# Patient Record
Sex: FEMALE
[De-identification: names, ages, dates, MRNs, and addresses within clinical notes are randomized per-mention and may not be internally consistent; named-entity substitution may affect disease eponyms.]

---

## 2017-03-24 ENCOUNTER — HOSPITAL ENCOUNTER (OUTPATIENT)
Dept: HOSPITAL 42 - ED | Age: 42
Setting detail: OBSERVATION
LOS: 1 days | Discharge: HOME | End: 2017-03-25
Attending: INTERNAL MEDICINE | Admitting: INTERNAL MEDICINE
Payer: MEDICARE

## 2017-03-24 VITALS — SYSTOLIC BLOOD PRESSURE: 131 MMHG | TEMPERATURE: 98 F | DIASTOLIC BLOOD PRESSURE: 75 MMHG

## 2017-03-24 VITALS — BODY MASS INDEX: 22.9 KG/M2

## 2017-03-24 DIAGNOSIS — Z87.892: ICD-10-CM

## 2017-03-24 DIAGNOSIS — G89.29: ICD-10-CM

## 2017-03-24 DIAGNOSIS — K85.90: Primary | ICD-10-CM

## 2017-03-24 DIAGNOSIS — F31.9: ICD-10-CM

## 2017-03-24 DIAGNOSIS — J45.909: ICD-10-CM

## 2017-03-24 DIAGNOSIS — K56.41: ICD-10-CM

## 2017-03-24 DIAGNOSIS — Z88.0: ICD-10-CM

## 2017-03-24 DIAGNOSIS — R31.9: ICD-10-CM

## 2017-03-24 DIAGNOSIS — F41.9: ICD-10-CM

## 2017-03-24 DIAGNOSIS — Z90.49: ICD-10-CM

## 2017-03-24 DIAGNOSIS — K86.1: ICD-10-CM

## 2017-03-24 DIAGNOSIS — F17.210: ICD-10-CM

## 2017-03-24 DIAGNOSIS — R10.9: ICD-10-CM

## 2017-03-24 DIAGNOSIS — E86.9: ICD-10-CM

## 2017-03-24 LAB
ADD MANUAL DIFF?: NO [,]
ALBUMIN/GLOB SERPL: 1.2 [,] (ref 1.1–1.8)
ALP SERPL-CCNC: 99 [, U/L] (ref 38–133)
ALT SERPL-CCNC: 26 [, U/L] (ref 7–56)
AMYLASE SERPL-CCNC: 40 [, U/L] (ref 35–125)
APPEARANCE UR: (no result) [,]
AST SERPL-CCNC: 28 [, U/L] (ref 15–39)
BACTERIA #/AREA URNS HPF: (no result) [,]
BASOPHILS # BLD AUTO: 0.03 [, K/MM3] (ref 0–2)
BASOPHILS NFR BLD: 0.3 [, %] (ref 0–3)
BILIRUB SERPL-MCNC: 0.5 [, MG/DL] (ref 0.2–1.3)
BILIRUB UR-MCNC: (no result) [,]
BUN SERPL-MCNC: 9 [, MG/DL] (ref 7–21)
CALCIUM SERPL-MCNC: 8.8 [, MG/DL] (ref 8.4–10.5)
CHLORIDE SERPL-SCNC: 104 [, MMOL/L] (ref 98–107)
CO2 SERPL-SCNC: 24 [, MMOL/L] (ref 21–33)
COLOR UR: YELLOW [,]
EOSINOPHIL # BLD: 0.3 [,] (ref 0–0.7)
EOSINOPHIL NFR BLD: 3 [, %] (ref 1.5–5)
EPI CELLS #/AREA URNS HPF: (no result) [, /HPF] (ref 0–5)
ERYTHROCYTE [DISTWIDTH] IN BLOOD BY AUTOMATED COUNT: 14.2 [, %] (ref 11.5–14.5)
GLOBULIN SER-MCNC: 3.3 [, GM/DL]
GLUCOSE SERPL-MCNC: 105 [, MG/DL] (ref 70–110)
GLUCOSE UR STRIP-MCNC: NEGATIVE [, MG/DL]
GRANULOCYTES # BLD: 5.83 [,] (ref 1.4–6.5)
GRANULOCYTES NFR BLD: 63.4 [, %] (ref 50–68)
HCT VFR BLD CALC: 37.2 [, %] (ref 36–48)
KETONES UR STRIP-MCNC: (no result) [, MG/DL]
LEUKOCYTE ESTERASE UR-ACNC: (no result) [, LEU/UL]
LIPASE SERPL-CCNC: 29 [, U/L] (ref 23–300)
LYMPHOCYTES # BLD: 2.7 [,] (ref 1.2–3.4)
LYMPHOCYTES NFR BLD AUTO: 29.6 [, %] (ref 22–35)
MCH RBC QN AUTO: 30.9 [, PG] (ref 25–35)
MCHC RBC AUTO-ENTMCNC: 34.4 [, G/DL] (ref 31–37)
MCV RBC AUTO: 89.9 [, FL] (ref 80–105)
MONOCYTES # BLD AUTO: 0.3 [,] (ref 0.1–0.6)
MONOCYTES NFR BLD: 3.7 [, %] (ref 1–6)
PH UR STRIP: 6 [,] (ref 4.7–8)
PLATELET # BLD: 261 [, 10^3/UL] (ref 120–450)
PMV BLD AUTO: 10.6 [, FL] (ref 7–11)
POTASSIUM SERPL-SCNC: 4 [, MMOL/L] (ref 3.6–5)
PROT SERPL-MCNC: 7.1 [, G/DL] (ref 5.8–8.3)
PROT UR STRIP-MCNC: 30 [, MG/DL]
RBC # UR STRIP: (no result) [,]
SODIUM SERPL-SCNC: 137 [, MMOL/L] (ref 132–148)
SP GR UR STRIP: 1.02 [,] (ref 1–1.03)
UROBILINOGEN UR STRIP-ACNC: 0.2 [, E.U./DL]
WBC # BLD AUTO: 9.2 [, 10^3/UL] (ref 4.5–11)

## 2017-03-24 PROCEDURE — 96361 HYDRATE IV INFUSION ADD-ON: CPT

## 2017-03-24 PROCEDURE — 83690 ASSAY OF LIPASE: CPT

## 2017-03-24 PROCEDURE — 74176 CT ABD & PELVIS W/O CONTRAST: CPT

## 2017-03-24 PROCEDURE — 82150 ASSAY OF AMYLASE: CPT

## 2017-03-24 PROCEDURE — 84703 CHORIONIC GONADOTROPIN ASSAY: CPT

## 2017-03-24 PROCEDURE — 85025 COMPLETE CBC W/AUTO DIFF WBC: CPT

## 2017-03-24 PROCEDURE — 81001 URINALYSIS AUTO W/SCOPE: CPT

## 2017-03-24 PROCEDURE — 80053 COMPREHEN METABOLIC PANEL: CPT

## 2017-03-24 PROCEDURE — 96376 TX/PRO/DX INJ SAME DRUG ADON: CPT

## 2017-03-24 PROCEDURE — 99285 EMERGENCY DEPT VISIT HI MDM: CPT

## 2017-03-24 PROCEDURE — 96374 THER/PROPH/DIAG INJ IV PUSH: CPT

## 2017-03-24 PROCEDURE — 96375 TX/PRO/DX INJ NEW DRUG ADDON: CPT

## 2017-03-24 RX ADMIN — HYDROMORPHONE HYDROCHLORIDE PRN MG: 2 INJECTION, SOLUTION INTRAMUSCULAR; INTRAVENOUS; SUBCUTANEOUS at 21:27

## 2017-03-24 NOTE — CP.PCM.HP
<Karina Vázquez - Last Filed: 17 01:52>





History of Present Illness





- History of Present Illness


History of Present Illness: 


PGY-1 for Dr. Montaño





Admission: Mild Acute on chronic pancreatis





Patient is a 42 yo female with past medical history of pancreatic divisum at 

birth, has pancreatic stent with recent replacement 3 days ago and PICC 4 days 

ago, presents with worsening of chronic upper abdominal pain, (+) nausea, no 

vomiting, and decreased appetite over the past three days.  Pt States that at 

baseline, abdominal pain localizes at LUQ, sharp, constant, 4-6/10 in pain 

scale. This am the pain suddenly worsened to 8-10/10, not changed in character 

or location. Pt called her outpt GI doc Dr. Agustin who adviced pt to come to 

ED. (+) sick contact, daughter strep throat. Denies recent change in medications

, recent travel, change in food.





In the ED, VSS. CBC, CMP all wnl. Amylase, lipase normal. Proteinura, blood in 

urine, many epithelial cell


Pain controlled by dilaudid 3mg.





CT Abdomen - 





ROS = Denies Fever. Denies chest pain or shortness of breath. Denies 

hematemesis or dark or bloody stools.





PMH    pancreatic divisum at birth


   Chronic pancreatitis


   Asthma


   Bipolar, anxiety


   contact lens





PSH   Pancreas stent replacment @ month, PICC





OBGYN         1 elective , 1 live birth via vaginal delivery


          No period, on Oral contraceptive





SH   Active smoker 1 pack per week x recent years + electronic cigareete 1 once 

daily


      Past 10 years 1-2ppd


      Denies alcohol/drug





All   Penicillin





Med   Microgestin 1 tab PO DAILY


      Methadone [Methadone]   5 mg PO BID PRN


      Oxydodone 20 q6 PRN


      Duoneb 3q4 PRN


      Klonopin 0.5  TID PRN


      Simethicone 40 mg QID PRN


      Seroquel 200 daily


      Zofran 4q4 PRN





PMD/GI   Dr Agustin





Present on Admission





- Present on Admission


Any Indicators Present on Admission: No





Past Patient History





- Infectious Disease


Hx of Infectious Diseases: None





- Tetanus Immunizations


Tetanus Immunization: Unknown





- Past Medical History & Family History


Past Medical History?: Yes





- Past Social History


Smoking Status: Light Smoker < 10 Cigarettes Daily





- CARDIAC


Hx Cardiac Disorders: No





- PULMONARY


Hx Asthma: Yes





- NEUROLOGICAL


Hx Neurological Disorder: No





- HEENT


Hx HEENT Problems: Yes (wears contact lense)





- RENAL


Hx Chronic Kidney Disease: No





- ENDOCRINE/METABOLIC


Hx Endocrine Disorders: No





- HEMATOLOGICAL/ONCOLOGICAL


Hx Blood Disorders: No


Hx Blood Transfusions: No


Hx Blood Transfusion Reaction: No





- INTEGUMENTARY


Hx Dermatological Problems: No





- MUSCULOSKELETAL/RHEUMATOLOGICAL


Hx Musculoskeletal Disorders: No


Hx Falls: No





- GASTROINTESTINAL


Hx Pancreatitis: Yes (pancreatic divisum)





- GENITOURINARY/GYNECOLOGICAL


Hx Genitourinary Disorders: No





- PSYCHIATRIC


Hx Bipolar Disorder: Yes


Hx Emotional Abuse: No


Hx Physical Abuse: No


Hx Substance Use: No





- SURGICAL HISTORY


Other/Comment: Pancreas stent , PICC





- ANESTHESIA


Hx Anesthesia: Yes





Meds


Allergies/Adverse Reactions: 


 Allergies











Allergy/AdvReac Type Severity Reaction Status Date / Time


 


Penicillins Allergy  ANAPHYLAXIS Verified 17 13:13














Physical Exam





- Constitutional


Appears: No Acute Distress





- Head Exam


Head Exam: ATRAUMATIC, NORMOCEPHALIC





- Eye Exam


Eye Exam: EOMI, Normal appearance


Pupil Exam: NORMAL ACCOMODATION





- ENT Exam


ENT Exam: Mucous Membranes Moist





- Neck Exam


Neck exam: Positive for: Normal Inspection


Additional comments: 


supple





- Respiratory Exam


Respiratory Exam: Clear to Auscultation Bilateral, NORMAL BREATHING PATTERN.  

absent: Rales, Rhonchi, Wheezes





- Cardiovascular Exam


Cardiovascular Exam: REGULAR RHYTHM, +S1, +S2.  absent: Systolic Murmur





- GI/Abdominal Exam


GI & Abdominal Exam: Normal Bowel Sounds, Soft, Tenderness (LUQ).  absent: 

Guarding, Hernia, Rigid


Additional comments: 


negative rovsing, mcburney, moss





- Extremities Exam


Extremities exam: Positive for: normal capillary refill, normal inspection, 

pedal pulses present.  Negative for: pedal edema





- Back Exam


Back exam: absent: CVA tenderness (L), CVA tenderness (R)





- Neurological Exam


Neurological exam: Alert, Oriented x3





- Psychiatric Exam


Psychiatric exam: Normal Affect, Normal Mood





- Skin


Skin Exam: Dry, Warm





Results





- Vital Signs


Recent Vital Signs: 





 Last Vital Signs











Temp  97.5 F L  17 18:01


 


Pulse  68   17 18:01


 


Resp  20   17 18:01


 


BP  125/80   17 18:01


 


Pulse Ox  100   17 18:01














- Labs


Result Diagrams: 


 17 14:05





 03/24/17 14:05





Assessment & Plan





- Assessment and Plan (Free Text)


Plan: 


Patient is a 42 yo female with past medical history of asthma, pancreatic 

divisum at birth, has pancreatic stent with recent replacement 3 days ago and 

PICC 4 days ago, presents with worsening of chronic upper abdominal pain, (+) 

nausea, no vomiting, and decreased appetite over the past three days.  





Acute on chronic Pancreatitis


- Observe off antibiotcs


- IVF





Abdominal Pain


- NPO today


- Clear liquid diet AM


-  cc


- zofran PRN


- dialud 1 for moderate, 2 for severe pain, q 3 prn


- GI consult - Dr. Singh


- Hold home pain mds





Anxiety


- klonopin 0.25 q8h PRN





Hx asthma - duoneb PRN 





Prophylaxis


- IV protonix


- SCD





S/R/D/w Dr. Montaño





- Date & Time


Date: 17


Time: 20:40





<Wilmer Montaño Q - Last Filed: 17 02:24>





Results





- Vital Signs


Recent Vital Signs: 





 Last Vital Signs











Temp  98 F   17 22:59


 


Pulse  50 L  17 22:59


 


Resp  16   17 22:59


 


BP  131/75   17 22:59


 


Pulse Ox  97   17 22:03














- Labs


Result Diagrams: 


 17 14:05





 17 14:05





Attending/Attestation





- Attestation


I have personally seen and examined this patient.: Yes


I have fully participated in the care of the patient.: Yes


I have reviewed all pertinent clinical information: Yes

## 2017-03-24 NOTE — ED PDOC
Arrival/HPI





- General


Chief Complaint: Abdominal Pain


Time Seen by Provider: 03/24/17 13:25


Historian: Patient





- History of Present Illness


Narrative History of Present Illness (Text): 





03/24/17 14:29


Patient is a 42 yo female with past medical history of pancreatic divisum, has 

pancreatic stent with recent replacement 3 days ago, presents with history of 

upper abdominal pain, nausea/vomiting, and decreased appetite over the past 

three days.  States pain is typical of past episodes, not changed in character 

or location.


Denies fevers.  Denies chest pain or shortness of breath. Denies hematemesis or 

dark or bloody stools.





Past Medical History





- Infectious Disease


Hx of Infectious Diseases: None





- Tetanus Immunization


Tetanus Immunization: Unknown





- Cardiac


Hx Cardiac Disorders: No





- Pulmonary


Hx Asthma: Yes





- Neurological


Hx Neurological Disorder: No





- HEENT


Hx HEENT Disorder: Yes (wears contact lense)





- Renal


Hx Renal Disorder: No





- Endocrine/Metabolic


Hx Endocrine Disorders: No





- Hematological/Oncological


Hx Blood Disorders: No


Hx Blood Transfusions: No


Hx Blood Transfusion Reaction: No





- Integumentary


Hx Dermatological Disorder: No





- Musculoskeletal/Rheumatological


Hx Musculoskeletal Disorders: No


Hx Falls: No





- Gastrointestinal


Hx Pancreatitis: Yes (pancreatic divisum)





- Genitourinary/Gynecological


Hx Genitourinary Disorders: No





- Psychiatric


Hx Bipolar Disorder: Yes


Hx Emotional Abuse: No


Hx Physical Abuse: No


Hx Substance Use: No





- Surgical History


Other/Comment: Pancreas stent , PICC





- Anesthesia


Hx Anesthesia: Yes





- Suicidal Assessment


Feels Threatened In Home Enviroment: No





Family/Social History


Family/Social History: Unknown Family HX


Smoking Status: Light Smoker < 10 Cigarettes Daily


Hx Alcohol Use: No


Hx Substance Use: No





Allergies/Home Meds


Allergies/Adverse Reactions: 


Allergies





Penicillins Allergy (Verified 03/24/17 13:13)


 ANAPHYLAXIS








Home Medications: 


 Home Meds











 Medication  Instructions  Recorded  Confirmed


 


Birth Control 1 tab PO DAILY 08/07/15 03/24/17


 


Methadone 5 mg PO BID PRN 03/24/17 03/24/17














Review of Systems





- Review of Systems


Constitutional: Fatigue.  absent: Fevers


Eyes: absent: Vision Changes


ENT: absent: Hearing Changes


Respiratory: absent: SOB


Cardiovascular: absent: Chest Pain, FITZGERALD


Gastrointestinal: Abdominal Pain, Nausea, Vomiting, Appetite Changes.  absent: 

Constipation, Diarrhea, Hematochezia, Hematemesis


Musculoskeletal: absent: Back Pain


Skin: absent: Rash


Neurological: absent: Headache, Dizziness


Endocrine: absent: Polyuria, Polydipsia


Hemo/Lymphatic: absent: Easy Bleeding





Physical Exam





- Physical Exam


Narrative Physical Exam (Text): 


Head:  Atraumatic.  Normocephalic. 


Eyes:  PERRL.  EOMI.  Conjunctivae are not pale.


ENT:  Mucous membranes are moist and intact.  Oropharynx is clear and 

symmetric. 


Neck:  Supple.  Full ROM.  No JVD.  No lymphadenopathy.


Cardiovascular:  Regular rate.  Regular rhythm.  No murmurs, rubs, or gallops.  

Distal pulses are 2+ and symmetric.


Pulmonary/Chest:  No evidence of respiratory distress.  Clear to auscultation 

bilaterally.  No wheezing, rales or rhonchi.


Abdominal:  Moderate epigastric pain with normoactive bowel sounds, no lower 

abdominal pain, no pulsatile masses.


Back:  No CVA tenderness.


Extremities:  No edema.   No cyanosis.  No clubbing.  Full range of motion in 

all extremities.  No calf tenderness. PICC lined in left upper extremity clean, 

dry and intact.


Skin:  Skin is warm and dry.  No petechiae.  No purpura. 


Neurological:  Alert, awake, and oriented to person, place, time, and 

situation.  Normal speech. Motor and sensory exam intact.


Psychiatric:  Good eye contact.  Normal interaction, affect, and behavior. 











Vital Signs Reviewed: Yes


Vital Signs











  Temp Pulse Resp BP Pulse Ox


 


 03/24/17 22:03   70  16  138/77  97


 


 03/24/17 18:01  97.5 F L  68  20  125/80  100


 


 03/24/17 15:01   69  18  115/71  98


 


 03/24/17 13:22  97.9 F  73  18  117/75  98


 


 03/24/17 13:16  97.9 F  73  16  117/75  96











Temperature: Afebrile


Appearance: Positive for: Uncomfortable


Pain Distress: Moderate


Mental Status: Positive for: Alert and Oriented X 3





Medical Decision Making


ED Course and Treatment: 


Patient's case reviewed with Dr. Agustin, prior to arrival, who is the patient'

s gastroenterologist and has been following most recent symptoms.  Patient's 

history reviewed where she has had history of chronic pancreatitis/divisum, 

recently with stent change.


On exam she is diffusely tender, concentrated to epigastric region.  NO urinary 

symptoms, no chest pain or sob or chills.





As per Dr. Agustin, patient initiated on iv fluids and pain medication, risks/

side effects/addiction potential reviewed with patient prior to administration 

although patient is currently taking oral narcotics and pain has broken through.


With serial exams, no improvement in pain after multiple boluses, thus CT 

ordered as pain still severe after medication.  CT ordered:





CT Abdomen and Pelvis Without Intravenous Contrast.


FINDINGS:


Lower thorax: Heart size is normal. Lung bases are mildly hyperinflated. There 

is patchy airspace disease greatest at the left base. There is minimal scarring 

at the lung bases


ABDOMEN:


Gallbladder and bile ducts: Gallbladder is surgically absent. Common bile duct 

is prominent.


Pancreas: There are coarse calcifications in the pancreas greatest in the body 

and tail. There is a pancreatic stent. There is minimal peripancreatic 

stranding.


Stomach and bowel: Stomach is almost completely empty. Rotation is normal. 

Proximal jejunal loops are mildly distended with air-fluid levels. Distention 

decreases distally. Appendix and terminal ileum are unremarkable. There is a 

moderately large amount of stool throughout the colon.


ABDOMEN and PELVIS:


Intraperitoneal space: There is no significant fluid.There is no free air.


Bones/joints: There are degenerative changes in the osseus structures. Soft 

tissues: There is a fatcontaining umbilical hernia.


Vasculature: There are multiple phleboliths. There are vascular calcifications.


IMPRESSION: Chronic calcific pancreatitis with pancreatic stent, minimal 

peripancreatic stranding inflammation versus scarring; cholecystectomy with 

dilated common duct


Dictated and Authenticated by: Sarah Ansari MD


03/24/2017 8:23 PM Eastern Time ([place] & [place])





As patient with persistent severe pain will be admitted for observation with GI 

consultation, plan reviewed with Dr. Agustin, will admit to hospitalist.








- Lab Interpretations


Lab Results: 








 03/24/17 14:05 





 03/24/17 14:05 





 Lab Results





03/24/17 14:05: WBC 9.2, RBC 4.14, Hgb 12.8, Hct 37.2, MCV 89.9, MCH 30.9, MCHC 

34.4, RDW 14.2, Plt Count 261, MPV 10.6, Gran % 63.4, Lymph % (Auto) 29.6, Mono 

% (Auto) 3.7, Eos % (Auto) 3.0, Baso % (Auto) 0.3, Gran # 5.83, Lymph # 2.7, 

Mono # 0.3, Eos # 0.3, Baso # 0.03, Sodium 137, Potassium 4.0, Chloride 104, 

Carbon Dioxide 24, Anion Gap 13, BUN 9, Creatinine 0.7, Est GFR (African Amer) 

> 60, Est GFR (Non-Af Amer) > 60, Random Glucose 105, Calcium 8.8, Total 

Bilirubin 0.5, AST 28, ALT 26, Alkaline Phosphatase 99, Total Protein 7.1, 

Albumin 3.8, Globulin 3.3, Albumin/Globulin Ratio 1.2, Amylase 40, Lipase 29


03/24/17 13:45: Urine Color Yellow, Urine Appearance Turbid, Urine pH 6.0, Ur 

Specific Gravity 1.025, Urine Protein 30 H, Urine Glucose (UA) Negative, Urine 

Ketones Trace H, Urine Blood Small H, Urine Nitrate Negative, Urine Bilirubin 

Small H, Urine Urobilinogen 0.2, Ur Leukocyte Esterase Trace H, Urine RBC 1 - 3

, Urine WBC 1 - 3, Ur Epithelial Cells Many, Urine Bacteria Many, Urine HCG, 

Qual Negative











- RAD Interpretation


Radiology Orders: 








03/24/17 18:03


ABD & PELVIS W/O PO OR IV CONT [CT] Stat 














- Medication Orders


Current Medication Orders: 











Discontinued Medications





Albuterol/Ipratropium (Duoneb 3 Mg/0.5 Mg (3 Ml) Ud)  3 ml IH [id number] PRN


   PRN Reason: Wheezing


Clonazepam (Klonopin)  0.25 mg PO Q8 PRN; Protocol


   PRN Reason: Anxiety


Famotidine (Pepcid)  20 mg IVP STAT STA


   Stop: 03/24/17 13:30


   Last Admin: 03/24/17 14:07  Dose: 20 MG





IVP Administration


 Document     03/24/17 14:07  HI  (Rec: 03/24/17 14:07  [id number])


     Charges for Administration


      # of IVP Administrations                   1





Hydromorphone HCl (Dilaudid)  3 mg IVP STAT STA


   Stop: 03/24/17 13:33


   Last Admin: 03/24/17 14:08  Dose: 3 MG





IVP Administration


 Document     03/24/17 14:08  HI  (Rec: 03/24/17 14:08  [facility] [id number])


     Charges for Administration


      # of IVP Administrations                   1





Hydromorphone HCl (Dilaudid)  3 mg IVP STAT STA


   Stop: 03/24/17 15:40


   Last Admin: 03/24/17 15:56  Dose: 3 MG





IVP Administration


 Document     03/24/17 15:56  HI  (Rec: 03/24/17 15:56  [facility] [id number])


     Charges for Administration


      # of IVP Administrations                   1





Hydromorphone HCl (Dilaudid)  2 mg IVP STAT STA


   Stop: 03/24/17 17:20


   Last Admin: 03/24/17 17:50  Dose: 2 MG





IVP Administration


 Document     03/24/17 17:50  HI  (Rec: 03/24/17 17:50  [facility] [id number])


     Charges for Administration


      # of IVP Administrations                   1





Hydromorphone HCl (Dilaudid)  2 mg IVP Q3 PRN


   PRN Reason: Pain, severe (8-10)


   Last Admin: 03/25/17 09:42  Dose: 2 MG





MAR Pain Assessment


 Document     03/25/17 09:42  PRISCA  (Rec: 03/25/17 09:44  PRISCA  [id number])


     Pain Reassessment


      Is this a pain reassessment?               No


     Sleep


      Is patient sleeping during reassessment?   No


     Presence of Pain


      Presence of Pain                           Yes


     Pain Scale Used


      Pain Scale Used                            Numeric


     Location


      Left, Right or Bilateral                   Left


      Upper or Lower                             Upper


      Pain Location Body Site                    Abdomen


     Description


      Description                                Constant


      Intensity of Pain at present               8


      Pain Behavior                              Irritability


                                                 Withdrawal from Touch


                                                 Restlessness


                                                 Facial Grimacing


      Alleviating Factors/Management             Medication


       Techniques                                


IVP Administration


 Document     03/25/17 09:42  PRISCA  (Rec: 03/25/17 09:44  PRISCA  [id number])


     Charges for Administration


      # of IVP Administrations                   1





Sodium Chloride (Sodium Chloride 0.9%)  1,000 mls @ 1,000 mls/hr IV .Q1H STA


   Stop: 03/24/17 14:28


   Last Admin: 03/24/17 14:07  Dose: 1,000 MLS/HR





eMAR Start Stop


 Document     03/24/17 14:07  HI  (Rec: 03/24/17 14:07  [facility] [id number])


     Intravenous Solution


      Start Date                                 03/24/17


      Start Time                                 14:07


      End Date                                   03/24/17


      End time                                   15:07


      Total Infusion Time                        60





Sodium Chloride (Sodium Chloride 0.9%)  1,000 mls @ 150 mls/hr IV .Q6H40M [facility]


   Last Admin: 03/24/17 22:48  Dose: 150 MLS/HR





eMAR Start Stop


 Document     03/24/17 22:48  PCO  (Rec: 03/24/17 22:49  PCO  [facility] [id number])


     Intravenous Solution


      Start Date                                 03/24/17


      Start Time                                 22:48


      End Date                                   03/24/17


      End time                                   04:50


      Total Infusion Time                        -1078





Ondansetron HCl (Zofran Inj)  4 mg IVP STAT STA


   Stop: 03/24/17 13:30


   Last Admin: 03/24/17 14:07  Dose: 4 MG





IVP Administration


 Document     03/24/17 14:07  HI  (Rec: 03/24/17 14:07  HI  [id number])


     Charges for Administration


      # of IVP Administrations                   1





Ondansetron HCl (Zofran Inj)  4 mg IVP Q6 PRN


   PRN Reason: Nausea/Vomiting


   Last Admin: 03/25/17 06:32  Dose: 4 MG





IVP Administration


 Document     03/25/17 06:32  PCO  (Rec: 03/25/17 06:32  PCO  [id number])


     Charges for Administration


      # of IVP Administrations                   1





Oxycodone HCl (Oxycodone Immediate Release Tab)  20 mg PO Q6H PRN


   PRN Reason: Pain, severe (8-10)


Pantoprazole Sodium (Protonix Inj)  40 mg IVP DAILY [facility]


   Last Admin: 03/25/17 09:45  Dose: 40 MG





IVP Administration


 Document     03/25/17 09:45  PRISCA  (Rec: 03/25/17 09:45  [id number])


     Charges for Administration


      # of IVP Administrations                   1





Quetiapine Fumarate (Seroquel)  200 mg PO DAILY BRADY


   PRN Reason: Protocol


   Last Admin: 03/25/17 09:44  Dose: 200 MG





Behavioural


 Document     03/25/17 09:44  PRISCA  (Rec: 03/25/17 09:44  [facility]-[id number])


     Maintenance


      Maintenance Dose                           Yes


     Nonmedicinal


      Nonmedicinal Interventions                 Redirect


                                                 Therapeutic Communication


                                                 Give food/fluids


     Behavior


      Behavior for Medication:                   Anxiety





Simethicone (Mylicon Liq)  40 mg PO QID PRN


   PRN Reason: Dyspepsia











- Scribe Statement


The provider has reviewed the documentation as recorded by the Valariee


Kev Zayas





All medical record entries made by the Rayshawn were at my direction and 

personally dictated by me. I have reviewed the chart and agree that the record 

accurately reflects my personal performance of the history, physical exam, 

medical decision making, and the department course for this patient. I have 

also personally directed, reviewed, and agree with the discharge instructions 

and disposition.





Disposition/Present on Arrival





- Present on Arrival


Any Indicators Present on Arrival: No


History of DVT/PE: No


History of Uncontrolled Diabetes: No


Urinary Catheter: No


History of Decub. Ulcer: No


History Surgical Site Infection Following: None





- Disposition


Have Diagnosis and Disposition been Completed?: Yes


Diagnosis: 


 Pancreatitis


Disposition: HOSPITALIZED


Disposition Time: 17:13


Patient Plan: Discharge


Condition: GOOD

## 2017-03-24 NOTE — CT
EXAM:

  CT Abdomen and Pelvis Without Intravenous Contrast.



CLINICAL HISTORY:

  41 years old, female; Condition or disease; Pancreatic condition; 

Pancreatitis; Additional info: Pancreatitis with stent



TECHNIQUE:

  Axial computed tomography images of the abdomen and pelvis without 

intravenous contrast.  This CT exam was performed using one or more of the 

following dose reduction techniques:  automated exposure control, adjustment of 

the mA and/or kV according to patient size, and/or use of iterative 

reconstruction technique.

  Coronal and sagittal reformatted images were created and reviewed.



EXAM DATE/TIME:

  3/24/2017 6:03 PM



COMPARISON:

  There are no prior studies for comparison.



FINDINGS:

  Lower thorax:  Heart size is normal.  Lung bases are mildly hyperinflated.  

There is patchy airspace disease greatest at the left base.  There is minimal 

scarring at the lung bases



 ABDOMEN:

  Liver:  unremarkable

  Gallbladder and bile ducts:  Gallbladder is surgically absent.  Common bile 

duct is prominent.

  Pancreas:  There are coarse calcifications in the pancreas greatest in the 

body and tail.  There is a pancreatic stent.  There is minimal peripancreatic 

stranding.

  Spleen:  unremarkable

  Adrenals:  unremarkable

  Kidneys and ureters:  unremarkable

  Stomach and bowel:  Stomach is almost completely empty.  Rotation is normal.  

Proximal jejunal loops are mildly distended with air-fluid levels.  Distention 

decreases distally. Appendix and terminal ileum are unremarkable.  There is a 

moderately large amount of stool throughout the colon.

  Appendix:  See above.



 PELVIS:

  Bladder:  unremarkable

  Reproductive:  Uterus and adnexal structures are unremarkable.



 ABDOMEN and PELVIS:

  Intraperitoneal space:  There is no significant fluid.There is no free air.

  Bones/joints:  There are degenerative changes in the osseus structures.  Soft 

tissues:  There is a fat-containing umbilical hernia.

  Vasculature:  There are multiple phleboliths.  There are vascular 

calcifications.

  Lymph nodes:  There is no pathologic adenopathy.



IMPRESSION:  Chronic calcific pancreatitis with pancreatic stent, minimal 

peripancreatic stranding inflammation versus scarring; cholecystectomy with 

dilated common duct



Additional findings as described above.

## 2017-03-25 VITALS — HEART RATE: 54 BPM | RESPIRATION RATE: 20 BRPM | OXYGEN SATURATION: 96 %

## 2017-03-25 RX ADMIN — HYDROMORPHONE HYDROCHLORIDE PRN MG: 2 INJECTION, SOLUTION INTRAMUSCULAR; INTRAVENOUS; SUBCUTANEOUS at 00:16

## 2017-03-25 RX ADMIN — HYDROMORPHONE HYDROCHLORIDE PRN MG: 2 INJECTION, SOLUTION INTRAMUSCULAR; INTRAVENOUS; SUBCUTANEOUS at 03:41

## 2017-03-25 RX ADMIN — HYDROMORPHONE HYDROCHLORIDE PRN MG: 2 INJECTION, SOLUTION INTRAMUSCULAR; INTRAVENOUS; SUBCUTANEOUS at 09:42

## 2017-03-25 RX ADMIN — HYDROMORPHONE HYDROCHLORIDE PRN MG: 2 INJECTION, SOLUTION INTRAMUSCULAR; INTRAVENOUS; SUBCUTANEOUS at 06:26

## 2017-03-25 NOTE — CON
DATE: 03/25/2017



I examined the patient this morning.  She is a 41-year-old white female known 
to consultant with past medical history of pancreas divisum.  I reviewed this 
case with Dr. Evangelist Ortez in the Emergency Room yesterday.  The patient recently 
had a pancreatic duct stent replaced by a [place] [facility]; 
subsequently experienced exacerbation of abdominal pain with nausea and 
vomiting.  This stent exchange is done on a periodic basis.  In discussion with 
the patient prior to coming into the Emergency Room yesterday indicated that 
the pain was out of control.  Abdomen was distended and she had a significant 
degree of nausea and vomiting.  The patient was advised to come to the 
Emergency Room.  Even though the labs were noncontributory yesterday, in 
discussion with Dr. Ortez, it was decided to have the patient admitted due to 
issues regarding severe fluid depletion and possible pancreatitis exacerbation.
  This was pointed out by performance of a CT scan, which I will elaborate on 
later.



In discussion with the patient at the bedside this morning, the patient 
indicates decreased abdominal distention.  No fever.  Pain is less and she is 
able to handle small volumes of clears.  She is able to walk around as well.  
Analgesic control is apparently adequate.  Nausea has decreased somewhat as 
well.



PHYSICAL EXAMINATION:

VITAL SIGNS:  I reviewed this patient's vital signs.

HEENT:  Significant for dry mouth.

LUNGS:  Exhibited diffuse wheezes.

HEART:  Regular rhythm.

ABDOMEN:  Significant for being apparently less distended than yesterday.  She 
is tender in the left paraumbilical and the epigastric area; however, the other 
quadrants were noncontributory.  There is also very mild periumbilical 
distention.



I reviewed the CT images this morning.  CAT scan indicates mildly hyperinflated 
lungs.  The gallbladder is absent with dilatation of a bile duct which she has 
had in the past due to cholecystectomy.  There are calcifications in the 
pancreas, but no pseudocyst and the pancreatic stent is noted.  There are small 
bowel air-fluid levels probably in the proximal jejunum.  There is evidence of 
mild fecal impaction probably based on pancreatitis issue.  



OVERALL ASSESSMENT:  This is a 41-year-old white female with history of 
pancreas divisum apparently experienced exacerbation of severe pancreatitis 
after recent pancreatic duct stent insertion.



The patient has improved dramatically after fluids which are currently at the 
rate of 150 an hour.  I would tend to continue her on this rate until she is 
discharged.  Usually, when she presents with pancreatitis. there is a 
significant amount of fluid depletion.  At the current time point, her urine 
output is not where it is optimally; however, this should improve throughout 
the day today.  Analgesic control is adequate on 2 q. 3; however, if she 
continues to breakthrough, one may consider increasing up to 3 IV every 3 hours 
Dilaudid.  Note that in addition to her standard oxycodone and OxyContin for 
chronic pain control at home, she has been on periodic pulse dose of methadone.
  Combination therapy works better in this patient.  The patient is also on a 
PPI.



The patient has diffuse wheezes.  I asked her if she wished a respiratory 
treatment at bedside, but she deferred and opted for her inhaler instead.  When 
house staff makes rounds this morning, they may consider a respiratory 
treatment if she is still having some respiratory difficulty.



The house staff has increased the patient to a liquid diet.  Advised very, very 
small volumes of clear liquid only at the current time point.  It is a 
possibility that if she is able to handle small volumes of clears, she may be 
able to go home today.  That is assuming that the nausea, vomiting and pain 
issues are not inhibitory.  She will make a decision later on today.





__________________________________________

Isrrael Agustin DO, PhD







cc:   



DD: 03/25/2017 05:14:31  [id number]

TT: 03/25/2017 07:18:37

Confirmation # [id number]

Dictation # [id number]

felix MARINO

## 2017-03-25 NOTE — CP.PCM.DIS
<David Jordan - Last Filed: 03/25/17 16:31>





Provider





- Provider


Date of Admission: 


03/24/17 18:34





Attending physician: 


Pancho Jeff MD





Primary care physician: 


Isrrael Agustin DO





Consults: 


FRED: Vamsi


Time Spent in preparation of Discharge (in minutes): 45





Hospital Course





- Lab Results


Lab Results: 


 Most Recent Lab Values











WBC  9.2 10^3/ul (4.5-11.0)   03/24/17  14:05    


 


RBC  4.14 10^6/uL (3.5-6.1)   03/24/17  14:05    


 


Hgb  12.8 gm/dL (12.0-16.0)   03/24/17  14:05    


 


Hct  37.2 % (36.0-48.0)   03/24/17  14:05    


 


MCV  89.9 fL (80.0-105.0)   03/24/17  14:05    


 


MCH  30.9 pg (25.0-35.0)   03/24/17  14:05    


 


MCHC  34.4 g/dl (31.0-37.0)   03/24/17  14:05    


 


RDW  14.2 % (11.5-14.5)   03/24/17  14:05    


 


Plt Count  261 10^3/uL (120.0-450.0)   03/24/17  14:05    


 


MPV  10.6 fl (7.0-11.0)   03/24/17  14:05    


 


Gran %  63.4 % (50.0-68.0)   03/24/17  14:05    


 


Lymph % (Auto)  29.6 % (22.0-35.0)   03/24/17  14:05    


 


Mono % (Auto)  3.7 % (1.0-6.0)   03/24/17  14:05    


 


Eos % (Auto)  3.0 % (1.5-5.0)   03/24/17  14:05    


 


Baso % (Auto)  0.3 % (0.0-3.0)   03/24/17  14:05    


 


Gran #  5.83  (1.4-6.5)   03/24/17  14:05    


 


Lymph #  2.7  (1.2-3.4)   03/24/17  14:05    


 


Mono #  0.3  (0.1-0.6)   03/24/17  14:05    


 


Eos #  0.3  (0.0-0.7)   03/24/17  14:05    


 


Baso #  0.03 K/mm3 (0.0-2.0)   03/24/17  14:05    


 


Sodium  137 mmol/L (132-148)   03/24/17  14:05    


 


Potassium  4.0 mmol/L (3.6-5.0)   03/24/17  14:05    


 


Chloride  104 mmol/L ()   03/24/17  14:05    


 


Carbon Dioxide  24 mmol/L (21-33)   03/24/17  14:05    


 


Anion Gap  13  (10-20)   03/24/17  14:05    


 


BUN  9 mg/dL (7-21)   03/24/17  14:05    


 


Creatinine  0.7 mg/dL (0.5-1.4)   03/24/17  14:05    


 


Est GFR ( Amer)  > 60   03/24/17  14:05    


 


Est GFR (Non-Af Amer)  > 60   03/24/17  14:05    


 


Random Glucose  105 mg/dL ()   03/24/17  14:05    


 


Calcium  8.8 mg/dL (8.4-10.5)   03/24/17  14:05    


 


Total Bilirubin  0.5 mg/dL (0.2-1.3)   03/24/17  14:05    


 


AST  28 U/L (15-39)   03/24/17  14:05    


 


ALT  26 U/L (7-56)   03/24/17  14:05    


 


Alkaline Phosphatase  99 U/L ()   03/24/17  14:05    


 


Total Protein  7.1 g/dL (5.8-8.3)   03/24/17  14:05    


 


Albumin  3.8 g/dL (3.0-4.8)   03/24/17  14:05    


 


Globulin  3.3 gm/dL  03/24/17  14:05    


 


Albumin/Globulin Ratio  1.2  (1.1-1.8)   03/24/17  14:05    


 


Amylase  40 U/L ()   03/24/17  14:05    


 


Lipase  29 U/L ()   03/24/17  14:05    


 


Urine Color  Yellow  (YELLOW)   03/24/17  13:45    


 


Urine Appearance  Turbid  (CLEAR)   03/24/17  13:45    


 


Urine pH  6.0  (4.7-8.0)   03/24/17  13:45    


 


Ur Specific Gravity  1.025  (1.005-1.035)   03/24/17  13:45    


 


Urine Protein  30 mg/dL (<30 mg/dL)  H  03/24/17  13:45    


 


Urine Glucose (UA)  Negative mg/dL (NEGATIVE)   03/24/17  13:45    


 


Urine Ketones  Trace mg/dL (NEGATIVE)  H  03/24/17  13:45    


 


Urine Blood  Small  (NEGATIVE)  H  03/24/17  13:45    


 


Urine Nitrate  Negative  (NEGATIVE)   03/24/17  13:45    


 


Urine Bilirubin  Small  (NEGATIVE)  H  03/24/17  13:45    


 


Urine Urobilinogen  0.2 E.U./dL (<1 E.U./dL)   03/24/17  13:45    


 


Ur Leukocyte Esterase  Trace Deanna/uL (NEGATIVE)  H  03/24/17  13:45    


 


Urine RBC  1 - 3 /hpf (0-2)   03/24/17  13:45    


 


Urine WBC  1 - 3 /hpf (0-6)   03/24/17  13:45    


 


Ur Epithelial Cells  Many /hpf (0-5)   03/24/17  13:45    


 


Urine Bacteria  Many  (NEG)   03/24/17  13:45    


 


Urine HCG, Qual  Negative  (NEGATIVE)   03/24/17  13:45    














- Hospital Course


Hospital Course: 


Upon Admission: 


42yo F with PMHx of Pancreatic divisium s/p multiple pancreatic stents with 

repacement (last one 3 days ago) here for evaluation of worsening chronic Upper 

Abdominal pain. Amylase and lipase were wnl. CT showed evidence of chronic 

pancreatitis, and stent, minimal peripancreatic stranding. Patient was started 

on agressive IVF hydration using the PICC line that she came in with, placed 4 

days ago at [facility]. Patient received IV analgesics overnight. 

Patient status was much improved the next morning. Dr. Agustin, patient's 

outpatient GI evaluated the patient and recommended slowing advancing diet as 

tolerated. Patient states that she feels much better and would like to be 

discharged today. She was advised to advance her diet very cautiously and to 

stay well hydrated. Patient requested to keep her PICC line in as she came in 

with it. Detailed discussion was had with Dr. Agustin, who states that he 

knows the patient very well, and has been treating the patient for the past 10 

years. He states that the patient is very low risk for IV drug abuse. It was 

deemed that the patient may keep the PICC line due to poor venous access. 

Patient was advised to follow up with her out-patient physicians for continued 

management. She was advised to discontinue smoking ciggaretts as soon as 

possible. Detailed discussion was had with patient about the risks of keeping 

the PICC line, all of the patient's questions were answered. Patient states 

that Dr. Agustin manages her pain as out-patient and states that she has all 

her pain medications and does not require any refills. She was deemed stable 

for discharge with close follow up with her physicians. Patient agrees with 

plan. 





1. Acute on chronic pancreatitis. improved. Advance diet slowly and cautiously. 

Follow up with out-patient GI


2. Anxiety. continue home meds


3. Tobacco abuse. Cessation counseling


4. Hx of Asthma. Continue home meds





Upon Discharge:


You have been admitted to the hospital for Abdominal pain. Return to the 

nearest emergency room if symptoms worsen. Follow up with primary medical 

doctor in one week. 





Patient is cleared for discharge as per Dr. Jeff





1. Follow up with your Primary Care Physician in 2-3 days


2. Follow up with Dr. Hough in 2-3 days


3. Advance diet slowly as tolerated. Stay well hydrated


4. Resume all home medications. Use pain medications sparingly, as directed, as 

tolerated. Patient does not require any new prescriptions. 


5. Return to the ER with any concerning symptoms





No New Prescriptions. 





Discharge Exam





- Head Exam


Head Exam: ATRAUMATIC, NORMAL INSPECTION, NORMOCEPHALIC





- Eye Exam


Eye Exam: EOMI, Normal appearance, PERRL.  absent: Scleral icterus


Pupil Exam: PERRL





- ENT Exam


ENT Exam: Mucous Membranes Moist





- Neck Exam


Neck exam: Full Rom





- Respiratory Exam


Respiratory Exam: Clear to PA & Lateral, NORMAL BREATHING PATTERN, 

UNREMARKABLE.  absent: Decreased Breath Sounds, Respiratory Distress





- Cardiovascular Exam


Cardiovascular Exam: REGULAR RHYTHM, RRR, +S1, +S2.  absent: JVD





- GI/Abdominal Exam


GI & Abdominal Exam: Normal Bowel Sounds, Soft


Additional comments: 


mild tenderness to palpation RUQ





- Extremities Exam


Extremities exam: normal inspection





- Back Exam


Back exam: NORMAL INSPECTION





- Neurological Exam


Neurological exam: Alert, CN II-XII Intact, Normal Gait, Oriented x3





- Psychiatric Exam


Psychiatric exam: Normal Affect, Normal Mood





- Skin


Skin Exam: Dry, Intact, Normal Color, Warm





Discharge Plan





- Follow Up Plan


Condition: GOOD


Disposition: HOME/ ROUTINE


Instructions:  Pancreatitis (DC), Pneumococcal Vaccine for Adults (DC), 

Influenza Vaccine (DC), Abdominal Pain (ED)


Additional Instructions: 


You have been admitted to the hospital for Abdominal pain. Return to the 

nearest emergency room if symptoms worsen. Follow up with primary medical 

doctor in one week. 





Patient is cleared for discharge as per Dr. Jeff





1. Follow up with your Primary Care Physician in 2-3 days


2. Follow up with Dr. Hough in 2-3 days


3. Advance diet slowly as tolerated. Stay well hydrated


4. Resume all home medications. Use pain medications sparingly, as directed, as 

tolerated. Patient does not require any new prescriptions. 


5. Return to the ER with any concerning symptoms





No New Prescriptions. 


Referrals: 


Isrrael Agustin DO [Primary Care Provider] - 





<Pancho Jeff MD - Last Filed: 03/25/17 17:39>





Provider





- Provider


Date of Admission: 


03/24/17 18:34





Attending physician: 


Pancho Jeff MD





Primary care physician: 


Israrel Agustin DO








Hospital Course





- Lab Results


Lab Results: 


 Most Recent Lab Values











WBC  9.2 10^3/ul (4.5-11.0)   03/24/17  14:05    


 


RBC  4.14 10^6/uL (3.5-6.1)   03/24/17  14:05    


 


Hgb  12.8 gm/dL (12.0-16.0)   03/24/17  14:05    


 


Hct  37.2 % (36.0-48.0)   03/24/17  14:05    


 


MCV  89.9 fL (80.0-105.0)   03/24/17  14:05    


 


MCH  30.9 pg (25.0-35.0)   03/24/17  14:05    


 


MCHC  34.4 g/dl (31.0-37.0)   03/24/17  14:05    


 


RDW  14.2 % (11.5-14.5)   03/24/17  14:05    


 


Plt Count  261 10^3/uL (120.0-450.0)   03/24/17  14:05    


 


MPV  10.6 fl (7.0-11.0)   03/24/17  14:05    


 


Gran %  63.4 % (50.0-68.0)   03/24/17  14:05    


 


Lymph % (Auto)  29.6 % (22.0-35.0)   03/24/17  14:05    


 


Mono % (Auto)  3.7 % (1.0-6.0)   03/24/17  14:05    


 


Eos % (Auto)  3.0 % (1.5-5.0)   03/24/17  14:05    


 


Baso % (Auto)  0.3 % (0.0-3.0)   03/24/17  14:05    


 


Gran #  5.83  (1.4-6.5)   03/24/17  14:05    


 


Lymph #  2.7  (1.2-3.4)   03/24/17  14:05    


 


Mono #  0.3  (0.1-0.6)   03/24/17  14:05    


 


Eos #  0.3  (0.0-0.7)   03/24/17  14:05    


 


Baso #  0.03 K/mm3 (0.0-2.0)   03/24/17  14:05    


 


Sodium  137 mmol/L (132-148)   03/24/17  14:05    


 


Potassium  4.0 mmol/L (3.6-5.0)   03/24/17  14:05    


 


Chloride  104 mmol/L ()   03/24/17  14:05    


 


Carbon Dioxide  24 mmol/L (21-33)   03/24/17  14:05    


 


Anion Gap  13  (10-20)   03/24/17  14:05    


 


BUN  9 mg/dL (7-21)   03/24/17  14:05    


 


Creatinine  0.7 mg/dL (0.5-1.4)   03/24/17  14:05    


 


Est GFR ( Amer)  > 60   03/24/17  14:05    


 


Est GFR (Non-Af Amer)  > 60   03/24/17  14:05    


 


Random Glucose  105 mg/dL ()   03/24/17  14:05    


 


Calcium  8.8 mg/dL (8.4-10.5)   03/24/17  14:05    


 


Total Bilirubin  0.5 mg/dL (0.2-1.3)   03/24/17  14:05    


 


AST  28 U/L (15-39)   03/24/17  14:05    


 


ALT  26 U/L (7-56)   03/24/17  14:05    


 


Alkaline Phosphatase  99 U/L ()   03/24/17  14:05    


 


Total Protein  7.1 g/dL (5.8-8.3)   03/24/17  14:05    


 


Albumin  3.8 g/dL (3.0-4.8)   03/24/17  14:05    


 


Globulin  3.3 gm/dL  03/24/17  14:05    


 


Albumin/Globulin Ratio  1.2  (1.1-1.8)   03/24/17  14:05    


 


Amylase  40 U/L ()   03/24/17  14:05    


 


Lipase  29 U/L ()   03/24/17  14:05    


 


Urine Color  Yellow  (YELLOW)   03/24/17  13:45    


 


Urine Appearance  Turbid  (CLEAR)   03/24/17  13:45    


 


Urine pH  6.0  (4.7-8.0)   03/24/17  13:45    


 


Ur Specific Gravity  1.025  (1.005-1.035)   03/24/17  13:45    


 


Urine Protein  30 mg/dL (<30 mg/dL)  H  03/24/17  13:45    


 


Urine Glucose (UA)  Negative mg/dL (NEGATIVE)   03/24/17  13:45    


 


Urine Ketones  Trace mg/dL (NEGATIVE)  H  03/24/17  13:45    


 


Urine Blood  Small  (NEGATIVE)  H  03/24/17  13:45    


 


Urine Nitrate  Negative  (NEGATIVE)   03/24/17  13:45    


 


Urine Bilirubin  Small  (NEGATIVE)  H  03/24/17  13:45    


 


Urine Urobilinogen  0.2 E.U./dL (<1 E.U./dL)   03/24/17  13:45    


 


Ur Leukocyte Esterase  Trace Deanna/uL (NEGATIVE)  H  03/24/17  13:45    


 


Urine RBC  1 - 3 /hpf (0-2)   03/24/17  13:45    


 


Urine WBC  1 - 3 /hpf (0-6)   03/24/17  13:45    


 


Ur Epithelial Cells  Many /hpf (0-5)   03/24/17  13:45    


 


Urine Bacteria  Many  (NEG)   03/24/17  13:45    


 


Urine HCG, Qual  Negative  (NEGATIVE)   03/24/17  13:45    














Attending/Attestation





- Attestation


I have personally seen and examined this patient.: Yes


I have fully participated in the care of the patient.: Yes


I have reviewed all pertinent clinical information, including history, physical 

exam and plan: Yes


Notes (Text): 


Patient was seen and examined with medical resident .Agreed with resident 

assessment and plan.





Patient abdominal pain has improved.She is tolerating liquid diet and wants to 

go home.She was recently discharged from outside hospital with PICC line as she 

has poor venous access, she did not want it to out as she is concerned that she 

may come back to the hospital.Patient case was discussed with her primary GI by 

resident and he told resident that patient does not has any history of  drug 

abuse and should be OK to go home with PICC line.This issue was discussed in 

detail with her.Patient promised that she would not use any drug, she is alert, 

awake, and understand the risk of endocarditis and blood stream infection due 

to mis use of PICC line.





Management plan was discussed in detail with patient


 Education was provided.

## 2018-03-10 ENCOUNTER — HOSPITAL ENCOUNTER (EMERGENCY)
Dept: HOSPITAL 42 - ED | Age: 43
Discharge: LEFT BEFORE DISCHARGE | End: 2018-03-10
Payer: MEDICARE

## 2018-03-10 VITALS — DIASTOLIC BLOOD PRESSURE: 74 MMHG | SYSTOLIC BLOOD PRESSURE: 115 MMHG | OXYGEN SATURATION: 98 % | HEART RATE: 77 BPM

## 2018-03-10 VITALS — TEMPERATURE: 97.6 F | RESPIRATION RATE: 18 BRPM

## 2018-03-10 VITALS — BODY MASS INDEX: 22.9 KG/M2

## 2018-03-10 DIAGNOSIS — R93.8: ICD-10-CM

## 2018-03-10 DIAGNOSIS — R10.9: Primary | ICD-10-CM

## 2018-03-10 LAB
ALBUMIN SERPL-MCNC: 3.6 G/DL (ref 3–4.8)
ALBUMIN/GLOB SERPL: 1.2 {RATIO} (ref 1.1–1.8)
ALT SERPL-CCNC: 21 U/L (ref 7–56)
APPEARANCE UR: (no result)
AST SERPL-CCNC: 24 U/L (ref 14–36)
BACTERIA #/AREA URNS HPF: (no result) /[HPF]
BASOPHILS # BLD AUTO: 0.04 K/MM3 (ref 0–2)
BASOPHILS NFR BLD: 0.4 % (ref 0–3)
BILIRUB UR-MCNC: (no result) MG/DL
BUN SERPL-MCNC: 10 MG/DL (ref 7–21)
CALCIUM SERPL-MCNC: 9 MG/DL (ref 8.4–10.5)
COLOR UR: YELLOW
EOSINOPHIL # BLD: 0.3 10*3/UL (ref 0–0.7)
EOSINOPHIL NFR BLD: 3.5 % (ref 1.5–5)
EPI CELLS #/AREA URNS HPF: (no result) /HPF (ref 0–5)
ERYTHROCYTE [DISTWIDTH] IN BLOOD BY AUTOMATED COUNT: 14.3 % (ref 11.5–14.5)
GFR NON-AFRICAN AMERICAN: > 60
GLUCOSE UR STRIP-MCNC: NEGATIVE MG/DL
GRANULOCYTES # BLD: 5.33 10*3/UL (ref 1.4–6.5)
GRANULOCYTES NFR BLD: 57.8 % (ref 50–68)
HCG,QUALITATIVE URINE: NEGATIVE
HGB BLD-MCNC: 12.6 G/DL (ref 12–16)
LEUKOCYTE ESTERASE UR-ACNC: NEGATIVE LEU/UL
LIPASE SERPL-CCNC: 39 U/L (ref 23–300)
LYMPHOCYTES # BLD: 3.1 10*3/UL (ref 1.2–3.4)
LYMPHOCYTES NFR BLD AUTO: 33.7 % (ref 22–35)
MCH RBC QN AUTO: 30.4 PG (ref 25–35)
MCHC RBC AUTO-ENTMCNC: 33.3 G/DL (ref 31–37)
MCV RBC AUTO: 91.3 FL (ref 80–105)
MONOCYTES # BLD AUTO: 0.4 10*3/UL (ref 0.1–0.6)
MONOCYTES NFR BLD: 4.6 % (ref 1–6)
PH UR STRIP: 6 [PH] (ref 4.7–8)
PLATELET # BLD: 288 10^3/UL (ref 120–450)
PMV BLD AUTO: 10.7 FL (ref 7–11)
PROT UR STRIP-MCNC: 30 MG/DL
RBC # BLD AUTO: 4.14 10^6/UL (ref 3.5–6.1)
RBC # UR STRIP: (no result) /UL
SP GR UR STRIP: >= 1.03 (ref 1–1.03)
UROBILINOGEN UR STRIP-ACNC: 1 E.U./DL
WBC # BLD AUTO: 9.2 10^3/UL (ref 4.5–11)

## 2018-03-10 PROCEDURE — 99284 EMERGENCY DEPT VISIT MOD MDM: CPT

## 2018-03-10 PROCEDURE — 96374 THER/PROPH/DIAG INJ IV PUSH: CPT

## 2018-03-10 PROCEDURE — 85025 COMPLETE CBC W/AUTO DIFF WBC: CPT

## 2018-03-10 PROCEDURE — 84703 CHORIONIC GONADOTROPIN ASSAY: CPT

## 2018-03-10 PROCEDURE — 80053 COMPREHEN METABOLIC PANEL: CPT

## 2018-03-10 PROCEDURE — 83690 ASSAY OF LIPASE: CPT

## 2018-03-10 PROCEDURE — 96375 TX/PRO/DX INJ NEW DRUG ADDON: CPT

## 2018-03-10 PROCEDURE — 96361 HYDRATE IV INFUSION ADD-ON: CPT

## 2018-03-10 PROCEDURE — 71045 X-RAY EXAM CHEST 1 VIEW: CPT

## 2018-03-10 PROCEDURE — 81001 URINALYSIS AUTO W/SCOPE: CPT

## 2018-03-10 PROCEDURE — 74177 CT ABD & PELVIS W/CONTRAST: CPT

## 2018-03-10 NOTE — ED PDOC
Arrival/HPI





<RonnyRanulfo - Last Filed: 03/10/18 23:27>





- General


Historian: Patient





- History of Present Illness


Time/Duration: Other (see hpi)


Context: Home





<Ana Victoria - Last Filed: 03/13/18 13:42>





- General


Chief Complaint: Upper Extremity Problem/Injury


Time Seen by Provider: 03/10/18 19:55





- History of Present Illness


Narrative History of Present Illness (Text): 





03/10/18 19:58


Patient is a 41 yo female with past medical history of pancreatic divisum, has 

pancreatic stent, presents to this ED c/o epigatsric pain, nausea, vomiting 

since last night.  Patient also noted clear drainage from left PICC line.  

Patient stated she has ERCP x 4 days ago.  Procedure was uneventfully.  Patient 

denies fever, sob, cp, urinary symptoms, vaginal discharge, STD exposure, 

dizziness, or abnormal gait.  


Patient was laying on the stretcher when I entered room, in no acute distress, 

texting with her cellphone.


 (Ana Victoria)





Past Medical History





- Provider Review


Nursing Documentation Reviewed: Yes





- Infectious Disease


Hx of Infectious Diseases: None





- Tetanus Immunization


Tetanus Immunization: Unknown





- Cardiac


Hx Cardiac Disorders: No





- Pulmonary


Hx Asthma: Yes





- Neurological


Hx Neurological Disorder: No





- HEENT


Hx HEENT Disorder: Yes (wears contact lense)





- Renal


Hx Renal Disorder: No





- Endocrine/Metabolic


Hx Endocrine Disorders: No





- Hematological/Oncological


Hx Blood Disorders: No


Hx Blood Transfusions: No


Hx Blood Transfusion Reaction: No





- Integumentary


Hx Dermatological Disorder: No





- Musculoskeletal/Rheumatological


Hx Musculoskeletal Disorders: No


Hx Falls: No





- Gastrointestinal


Hx Pancreatitis: Yes (pancreatic divisum)


Other/Comment: Chronic Pancreatiti - s/p ERCP 3/6/2018





- Genitourinary/Gynecological


Hx Genitourinary Disorders: No





- Psychiatric


Hx Bipolar Disorder: Yes


Hx Emotional Abuse: No


Hx Physical Abuse: No


Hx Substance Use: No





- Surgical History


Other/Comment: Pancreas stent in August 2016.  ERCP on 3/6/2018





- Anesthesia


Hx Anesthesia: Yes





- Suicidal Assessment


Feels Threatened In Home Enviroment: No





<Ana Victoria - Last Filed: 03/13/18 13:42>





Family/Social History





- Physician Review


Nursing Documentation Reviewed: Yes


Family/Social History: Other (noncontributory)


Smoking Status: Light Smoker < 10 Cigarettes Daily


Hx Alcohol Use: No


Hx Substance Use: No





<Ana Victoria - Last Filed: 03/13/18 13:42>





Allergies/Home Meds





<Ranulfo Jefferson - Last Filed: 03/10/18 23:27>





<Ana Victoria - Last Filed: 03/13/18 13:42>


Allergies/Adverse Reactions: 


Allergies





Penicillins Allergy (Verified 03/24/17 13:13)


 ANAPHYLAXIS








Home Medications: 


 Home Meds











 Medication  Instructions  Recorded  Confirmed


 


Birth Control 1 tab PO DAILY 08/07/15 03/10/18


 


Sertraline [Zoloft] 1 tab PO DAILY 03/10/18 03/10/18














Review of Systems





- Review of Systems


Constitutional: Normal.  absent: Fatigue, Weight Change, Fevers


Eyes: Normal


ENT: Normal


Respiratory: Normal


Cardiovascular: Normal


Gastrointestinal: Abdominal Pain, Nausea, Vomiting


Genitourinary Female: Normal


Musculoskeletal: Normal


Skin: Other (see hpi)


Neurological: Normal


Endocrine: Normal


Hemo/Lymphatic: Normal


Psychiatric: Normal





<Ana Victoria - Last Filed: 03/13/18 13:42>





Physical Exam


Temperature: Afebrile


Blood Pressure: Normal


Pulse: Regular


Respiratory Rate: Normal


Appearance: Positive for: Well-Appearing, Non-Toxic, Comfortable


Pain Distress: None


Mental Status: Positive for: Alert and Oriented X 3





- Systems Exam


Head: Present: Atraumatic, Normocephalic


Pupils: Present: PERRL


Extroacular Muscles: Present: EOMI


Conjunctiva: Present: Normal


Mouth: Present: Moist Mucous Membranes


Neck: Present: Normal Range of Motion


Respiratory/Chest: Present: Clear to Auscultation, Good Air Exchange.  No: 

Respiratory Distress, Accessory Muscle Use


Cardiovascular: Present: Regular Rate and Rhythm, Normal S1, S2.  No: Murmurs


Abdomen: Present: Tenderness (mild epigatric tenderness), Normal Bowel Sounds.  

No: Distention, Peritoneal Signs


Back: Present: Normal Inspection


Upper Extremity: Present: Normal Inspection, Normal ROM, NORMAL PULSES, 

Neurovascularly Intact, Capillary Refill < 2s, Other (PICC line left arm).  No: 

Cyanosis, Edema, Tenderness, Swelling, Erythema, Temperature Abnormalties


Lower Extremity: Present: Normal Inspection.  No: Edema


Neurological: Present: GCS=15, CN II-XII Intact, Speech Normal


Skin: Present: Warm, Dry, Normal Color.  No: Rashes


Psychiatric: Present: Alert, Oriented x 3, Normal Insight, Normal Concentration





<Ana Victoria - Last Filed: 03/13/18 13:42>


Vital Signs











  Temp Pulse Resp BP Pulse Ox


 


 03/10/18 21:05   77  18  115/74  98


 


 03/10/18 19:19  97.6 F  75  18  105/70  96














Medical Decision Making





<Ranulfo Jefferson - Last Filed: 03/10/18 23:27>





- Lab Interpretations


I have reviewed the lab results: Yes


Interpretation: No clinic. lab abnormalty





<Ana Victoria - Last Filed: 03/13/18 13:42>


ED Course and Treatment: 





03/10/18 20:57


Patient requested more pain medication to nurse.  When I entered patient's room

, she was texting while laying on her stretcher without acute pain distress.


03/10/18 23:23


Patient is not in the room.  Patient had ELOPED


03/10/18 23:30


I spoke with Dr. Agustin, PMD regarding CT findings.  I told Dr. Agustin 

patient had ELOPED since there was nobody taking care of patient daughter.  He 

said he will follow up patient on Monday. (Ana Victoria)





- Lab Interpretations


Lab Results: 








 03/10/18 20:05 





 03/10/18 20:05 





 Lab Results





03/10/18 21:50: Urine Color Yellow, Urine Appearance Slight-cloudy, Urine pH 6.0

, Ur Specific Gravity >= 1.030, Urine Protein 30 H, Urine Glucose (UA) Negative

, Urine Ketones Negative, Urine Blood Small H, Urine Nitrate Negative, Urine 

Bilirubin Small H, Urine Urobilinogen 1.0 H, Ur Leukocyte Esterase Negative, 

Urine RBC 5 - 10, Urine WBC 1 - 3, Ur Epithelial Cells Many, Urine Bacteria 

Large, Urine HCG, Qual Negative


03/10/18 20:05: Sodium 141, Potassium 4.3, Chloride 109 H, Carbon Dioxide 23, 

Anion Gap 13, BUN 10, Creatinine 0.7, Est GFR (African Amer) > 60, Est GFR (Non-

Af Amer) > 60, Random Glucose 94, Calcium 9.0, Total Bilirubin 0.3, AST 24, ALT 

21, Alkaline Phosphatase 116, Total Protein 6.6, Albumin 3.6, Globulin 3.0, 

Albumin/Globulin Ratio 1.2, Lipase 39


03/10/18 20:05: WBC 9.2, RBC 4.14, Hgb 12.6, Hct 37.8, MCV 91.3, MCH 30.4, MCHC 

33.3, RDW 14.3, Plt Count 288, MPV 10.7, Gran % 57.8, Lymph % (Auto) 33.7, Mono 

% (Auto) 4.6, Eos % (Auto) 3.5, Baso % (Auto) 0.4, Gran # 5.33, Lymph # (Auto) 

3.1, Mono # (Auto) 0.4, Eos # (Auto) 0.3, Baso # (Auto) 0.04











- RAD Interpretation


Narrative RAD Interpretations (Text): 





03/10/18 23:24


 CT Scan  


 


 


ABD   PELVIS IV CONTRAST ONLY                              Exam Date: 03/10/18  


 


 


 FINDINGS:  


   Lower thorax:  Heart size is normal. There is a hiatal hernia. There is   


 dependent atelectasis at the lung bases. There is minimal scarring.  


 


  ABDOMEN:  


   Liver:  There is fatty infiltration of the liver. Liver is mildly enlarged  

  


   Gallbladder and bile ducts:  Gallbladder is surgically absent. Common duct 

is   


 dilated. There is mild biliary ductal prominence.  


   Pancreas: Tail and proximal body of the pancreas are mildly atrophic. There 

  


 are coarse calcifications. There is prominence of pancreatic duct. The   


 pancreatic stent in the body and head extending into the duodenum.  


   Spleen:  There is a low attenuation lesion in the spleen most likely cysts.  


   Adrenals:  unremarkable  


   Kidneys and ureters:  unremarkable  


   Stomach and bowel:   Stomach is incompletely distended which accentuates the

   


 gastric wall. Rotation is normal. There are mildly dilated jejunal loops in 

the   


 left upper quadrant. There is fluid and air throughout the small bowel.   


 Ileocecal region is unremarkable. Appendix and terminal ileum are 

unremarkable.   


 There is moderate stool throughout the colon.  


   Appendix:  See stomach and bowel  


 


  PELVIS:  


   Bladder:  unremarkable  


   Reproductive:  Uterus and adnexal structures are unremarkable.  


 


  ABDOMEN and PELVIS:  


   Intraperitoneal space:  There is no significant fluid.There is no free air.  


   Bones/joints: There are degenerative changes in the osseus structures.  


   Soft tissues:  unremarkable  


   Vasculature:  There are calcified phleboliths.There is a filling defect   


 versus flow phenomenon in a branch of the superior mesenteric vein extending   


 into the portal vein. There are vascular calcifications in the aorta.  


   Lymph nodes:  There is no pathologic adenopathy.  


 


 IMPRESSION:  Chronic calcific pancreatitis with pancreatic stent, similar   


 findings seen on the prior study; mild enlarged fatty liver; cholecystectomy   


 with intra-and extrahepatic biliary ductal dilatation; thrombus in a branch of

   


 the superior mesenteric vein extending into the portal vein; probable mild   


 ileus, no obstruction  


 


 Additional nonemergent findings as described above.  


 


 


  (Ana Victoria)


Radiology Orders: 








03/10/18 20:00


CHEST PORTABLE [RAD] Stat 





03/10/18 20:02


ABD & PELVIS IV CONTRAST ONLY [CT] Stat 














- Medication Orders


Current Medication Orders: 











Discontinued Medications





Famotidine (Pepcid)  20 mg IVP STAT STA


   Stop: 03/10/18 20:01


   Last Admin: 03/10/18 20:18  Dose: 20 mg





IVP Administration


 Document     03/10/18 20:18  JOL  (Rec: 03/10/18 20:18  JOL  [id number])


     Charges for Administration


      # of IVP Administrations                   1





Hydromorphone HCl (Dilaudid)  1 mg IVP STAT STA


   Stop: 03/10/18 20:54


   Last Admin: 03/10/18 21:07  Dose: 1 mg





MAR Pain Assessment


 Document     03/10/18 21:07  JOL  (Rec: 03/10/18 21:07  JOL  [id number])


     Pain Reassessment


      Is this a pain reassessment?               No


     Sleep


      Is patient sleeping during reassessment?   No


     Presence of Pain


      Presence of Pain                           Yes


     Pain Scale Used


      Pain Scale Used                            Numeric


     Location


      Pain Location Body Site                    Abdomen


     Description


      Intensity of Pain at present               7


IVP Administration


 Document     03/10/18 21:07  JOL  (Rec: 03/10/18 21:07  JOL  [id number])


     Charges for Administration


      # of IVP Administrations                   1





Sodium Chloride (Sodium Chloride 0.9%)  1,000 mls @ 999 mls/hr IV .Q1H1M STA


   Stop: 03/10/18 21:53


   Last Admin: 03/10/18 21:06  Dose: 999 mls/hr





eMAR Start Stop


 Document     03/10/18 21:06  JOL  (Rec: 03/10/18 21:07  JOL  [id number])


     Intravenous Solution


      Start Date                                 03/10/18


      Start Time                                 21:07


      End Date                                   03/10/18


      End time                                   22:07


      Total Infusion Time                        60





Morphine Sulfate (Morphine)  4 mg IVP STAT STA


   Stop: 03/10/18 20:01


   Last Admin: 03/10/18 20:18  Dose: 4 mg





MAR Pain Assessment


 Document     03/10/18 20:18  JOL  (Rec: 03/10/18 20:18  JOL  [id number])


     Pain Reassessment


      Is this a pain reassessment?               No


     Sleep


      Is patient sleeping during reassessment?   No


     Presence of Pain


      Presence of Pain                           Yes


     Pain Scale Used


      Pain Scale Used                            Numeric


     Location


      Pain Location Body Site                    Abdomen


     Description


      Intensity of Pain at present               9


IVP Administration


 Document     03/10/18 20:18  JOL  (Rec: 03/10/18 20:18  JOL  [id number])


     Charges for Administration


      # of IVP Administrations                   1





Ondansetron HCl (Zofran Inj)  4 mg IVP STAT STA


   Stop: 03/10/18 20:01


   Last Admin: 03/10/18 20:18  Dose: 4 mg





IVP Administration


 Document     03/10/18 20:18  JOL  (Rec: 03/10/18 20:18  JOL  [id number])


     Charges for Administration


      # of IVP Administrations                   1














- PA / NP / Resident Statement


MD/ has reviewed & agrees with the documentation as recorded.





<Ranulfo Jefferson - Last Filed: 03/10/18 23:27>





Disposition/Present on Arrival





<Ranulfo Jefferson - Last Filed: 03/10/18 23:27>





- Present on Arrival


Any Indicators Present on Arrival: No


History of DVT/PE: No


History of Uncontrolled Diabetes: No


Urinary Catheter: No


History of Decub. Ulcer: No


History Surgical Site Infection Following: None





- Disposition


Have Diagnosis and Disposition been Completed?: Yes


Disposition Time: 23:38





<Ana Victoria - Last Filed: 03/13/18 13:42>





- Disposition


Diagnosis: 


 Abdominal pain, Abnormal finding on CT scan





Disposition: ELOPEMENT - ER ONLY


Condition: UNKNOWN


Referrals: 


Isrrael Agustin DO [Primary Care Provider] - Follow up with primary


Forms:  Tykli (English)

## 2018-03-11 NOTE — RAD
HISTORY:

epigastric pain  



COMPARISON:

08/07/2015 



FINDINGS:



LUNGS:

No active pulmonary disease.



PLEURA:

No significant pleural effusion identified, no pneumothorax apparent.



CARDIOVASCULAR:

Normal.



OSSEOUS STRUCTURES:

No significant abnormalities.



VISUALIZED UPPER ABDOMEN:

Normal.



OTHER FINDINGS:

None.



IMPRESSION:

No active disease. No significant interval change compared to the 

prior examination(s).



___________________________________________________________



Concordant results with the preliminary interpretation rendered by 

the emergency department physician

procedure.

## 2018-06-28 NOTE — CT
EXAM:

  CT Abdomen and Pelvis With Intravenous Contrast



EXAM DATE/TIME:

  3/10/2018 8:02 PM



CLINICAL HISTORY:

  42 years old, female; Pain; Other: Epigastric pain; Prior surgery; Surgery 

date: 6+ months; Surgery type: Cholecystectomy



TECHNIQUE:

  Axial computed tomography images of the abdomen and pelvis with intravenous 

contrast.  All CT scans at this facility use one or more dose reduction 

techniques, viz.: automated exposure control; ma/kV adjustment per patient size 

(including targeted exams where dose is matched to indication; i.e. head); or 

iterative reconstruction technique.

  Coronal and sagittal reformatted images were created and reviewed.



CONTRAST:

  100 mL of OMNIPAQUE 350 administered intravenously.



COMPARISON:

  CT - ABD   PELVIS W/O PO OR IV CONT 2017-03-24 19:30



FINDINGS:

  Lower thorax:  Heart size is normal. There is a hiatal hernia. There is 

dependent atelectasis at the lung bases. There is minimal scarring.



 ABDOMEN:

  Liver:  There is fatty infiltration of the liver. Liver is mildly enlarged  

  Gallbladder and bile ducts:  Gallbladder is surgically absent. Common duct is 

dilated. There is mild biliary ductal prominence.

  Pancreas: Tail and proximal body of the pancreas are mildly atrophic. There 

are coarse calcifications. There is prominence of pancreatic duct. The 

pancreatic stent in the body and head extending into the duodenum.

  Spleen:  There is a low attenuation lesion in the spleen most likely cysts.

  Adrenals:  unremarkable

  Kidneys and ureters:  unremarkable

  Stomach and bowel:   Stomach is incompletely distended which accentuates the 

gastric wall. Rotation is normal. There are mildly dilated jejunal loops in the 

left upper quadrant. There is fluid and air throughout the small bowel. 

Ileocecal region is unremarkable. Appendix and terminal ileum are unremarkable. 

There is moderate stool throughout the colon.

  Appendix:  See stomach and bowel



 PELVIS:

  Bladder:  unremarkable

  Reproductive:  Uterus and adnexal structures are unremarkable.



 ABDOMEN and PELVIS:

  Intraperitoneal space:  There is no significant fluid.There is no free air.

  Bones/joints: There are degenerative changes in the osseus structures.

  Soft tissues:  unremarkable

  Vasculature:  There are calcified phleboliths.There is a filling defect 

versus flow phenomenon in a branch of the superior mesenteric vein extending 

into the portal vein. There are vascular calcifications in the aorta.

  Lymph nodes:  There is no pathologic adenopathy.



IMPRESSION:  Chronic calcific pancreatitis with pancreatic stent, similar 

findings seen on the prior study; mild enlarged fatty liver; cholecystectomy 

with intra-and extrahepatic biliary ductal dilatation; thrombus in a branch of 

the superior mesenteric vein extending into the portal vein; probable mild 

ileus, no obstruction



Additional nonemergent findings as described above. 92